# Patient Record
Sex: FEMALE | Race: BLACK OR AFRICAN AMERICAN | Employment: UNEMPLOYED | URBAN - METROPOLITAN AREA
[De-identification: names, ages, dates, MRNs, and addresses within clinical notes are randomized per-mention and may not be internally consistent; named-entity substitution may affect disease eponyms.]

---

## 2020-02-24 ENCOUNTER — APPOINTMENT (OUTPATIENT)
Dept: CT IMAGING | Age: 35
End: 2020-02-24
Payer: COMMERCIAL

## 2020-02-24 ENCOUNTER — HOSPITAL ENCOUNTER (EMERGENCY)
Age: 35
Discharge: HOME | End: 2020-02-24
Attending: EMERGENCY MEDICINE
Payer: COMMERCIAL

## 2020-02-24 VITALS
BODY MASS INDEX: 50.02 KG/M2 | OXYGEN SATURATION: 100 % | SYSTOLIC BLOOD PRESSURE: 140 MMHG | TEMPERATURE: 98.2 F | HEIGHT: 64 IN | DIASTOLIC BLOOD PRESSURE: 76 MMHG | RESPIRATION RATE: 16 BRPM | WEIGHT: 293 LBS | HEART RATE: 70 BPM

## 2020-02-24 PROCEDURE — 70486 CT MAXILLOFACIAL W/O DYE: CPT

## 2020-02-24 PROCEDURE — 99283 EMERGENCY DEPT VISIT LOW MDM: CPT

## 2020-02-24 PROCEDURE — 70450 CT HEAD/BRAIN W/O DYE: CPT

## 2020-02-24 PROCEDURE — 6370000000 HC RX 637 (ALT 250 FOR IP): Performed by: EMERGENCY MEDICINE

## 2020-02-24 RX ORDER — OXYCODONE HYDROCHLORIDE AND ACETAMINOPHEN 5; 325 MG/1; MG/1
1 TABLET ORAL EVERY 6 HOURS PRN
Qty: 10 TABLET | Refills: 0 | Status: SHIPPED | OUTPATIENT
Start: 2020-02-24 | End: 2020-02-27

## 2020-02-24 RX ORDER — OXYCODONE HYDROCHLORIDE AND ACETAMINOPHEN 5; 325 MG/1; MG/1
1 TABLET ORAL ONCE
Status: COMPLETED | OUTPATIENT
Start: 2020-02-24 | End: 2020-02-24

## 2020-02-24 RX ADMIN — OXYCODONE HYDROCHLORIDE AND ACETAMINOPHEN 1 TABLET: 5; 325 TABLET ORAL at 14:11

## 2020-02-24 ASSESSMENT — PAIN SCALES - GENERAL
PAINLEVEL_OUTOF10: 7
PAINLEVEL_OUTOF10: 7

## 2020-02-24 ASSESSMENT — ENCOUNTER SYMPTOMS
TROUBLE SWALLOWING: 0
SHORTNESS OF BREATH: 0
WHEEZING: 0
STRIDOR: 0
COLOR CHANGE: 0
NAUSEA: 0
ABDOMINAL PAIN: 0
EYE PAIN: 1
VOMITING: 0
VOICE CHANGE: 0
FACIAL SWELLING: 1

## 2020-02-24 ASSESSMENT — VISUAL ACUITY
OU: 20/70
OS: 20/70

## 2020-02-24 ASSESSMENT — PAIN DESCRIPTION - ORIENTATION: ORIENTATION: RIGHT

## 2020-02-24 ASSESSMENT — PAIN DESCRIPTION - DESCRIPTORS: DESCRIPTORS: POUNDING

## 2020-02-24 ASSESSMENT — PAIN DESCRIPTION - LOCATION: LOCATION: EYE

## 2020-02-24 ASSESSMENT — PAIN DESCRIPTION - FREQUENCY: FREQUENCY: CONTINUOUS

## 2020-02-24 NOTE — ED NOTES
Metal eye shield applied to right eye per order of Dr. Jesu Yuen.      Geoffrey Ovalle RN  02/24/20 5895
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
## 2020-02-24 NOTE — ED NOTES
Access center contacted to repage facial plastics at [facility]. If they are still unavailable Dr. Chase Banks would like ophthalmology at [facility] paged.      Antonio Shaw RN  02/24/20 7339

## 2020-02-24 NOTE — ED NOTES
Received call from [facility] from Access center who states she spoke with [facility] who states ED at [facility] wants us to talk to facial plastic surgeon to have them accept the patient. Dr Loren Jin made aware.      Roxanne Bay, RN  02/24/20 4660

## 2020-02-24 NOTE — ED NOTES
Visual acuity right eye 20/50 with Dr. David Wright holding eye open. Patient aware of our continued efforts to speak with  facial plastic surgeon.      Priscella Andressa, PRACHI  02/24/20 [address], RN  02/24/20 1096

## 2020-02-24 NOTE — ED PROVIDER NOTES
unexpected weight change. HENT: Positive for facial swelling. Negative for trouble swallowing and voice change. Eyes: Positive for pain. Respiratory: Negative for shortness of breath, wheezing and stridor. Cardiovascular: Negative for chest pain and palpitations. Gastrointestinal: Negative for abdominal pain, nausea and vomiting. Genitourinary: Negative for dysuria and vaginal bleeding. Musculoskeletal: Negative for neck pain and neck stiffness. Skin: Negative for color change and wound. Neurological: Negative for seizures and syncope. Psychiatric/Behavioral: Negative for self-injury and suicidal ideas. Except as noted above the remainder of the review of systems was reviewed and negative. PAST MEDICAL HISTORY   History reviewed. No pertinent past medical history. SURGICAL HISTORY     History reviewed. No pertinent surgical history. CURRENT MEDICATIONS       Previous Medications    No medications on file       ALLERGIES     Patient has no known allergies. FAMILY HISTORY     History reviewed. No pertinent family history. SOCIAL HISTORY       Social History     Socioeconomic History    Marital status:      Spouse name: None    Number of children: None    Years of education: None    Highest education level: None   Occupational History    None   Social Needs    Financial resource strain: None    Food insecurity:     Worry: None     Inability: None    Transportation needs:     Medical: None     Non-medical: None   Tobacco Use    Smoking status: Current Every Day Smoker   Substance and Sexual Activity    Alcohol use:  Yes    Drug use: None    Sexual activity: None   Lifestyle    Physical activity:     Days per week: None     Minutes per session: None    Stress: None   Relationships    Social connections:     Talks on phone: None     Gets together: None     Attends Jew service: None     Active member of club or organization: None     Attends meetings of clubs or organizations: None     Relationship status: None    Intimate partner violence:     Fear of current or ex partner: None     Emotionally abused: None     Physically abused: None     Forced sexual activity: None   Other Topics Concern    None   Social History Narrative    None         PHYSICAL EXAM    (up to 7 for level 4, 8 or more for level 5)     ED Triage Vitals [02/24/20 1245]   BP Temp Temp Source Pulse Resp SpO2 Height Weight   (!) 144/87 98.1 °F (36.7 °C) Oral 70 16 99 % 5' 4\" (1.626 m) (!) 377 lb 13.9 oz (171.4 kg)       Physical Exam  Vitals signs and nursing note reviewed. Constitutional:       Appearance: She is well-developed. She is not diaphoretic. HENT:      Head:      Comments: Obvious swelling to right external eye. No raccoon sign, lemon sign, hemotympanum. No palpable skull fractures. Right Ear: External ear normal.      Left Ear: External ear normal.   Eyes:      Comments: Left external and globe normal.  Significant right periorbital swelling. Eye is able to be opened with external force with diffuse conjunctival hemorrhage to the right eye however pupils equal round and reactive to light. Patient's visual acuity is intact when eye is opened with external force. Patient is able to look up normally but does report pain with downward vision gaze. Neck:      Musculoskeletal: Neck supple. Vascular: No JVD. Trachea: No tracheal deviation. Cardiovascular:      Rate and Rhythm: Normal rate. Pulmonary:      Effort: Pulmonary effort is normal. No respiratory distress. Breath sounds: Normal breath sounds. No wheezing. Abdominal:      General: There is no distension. Palpations: Abdomen is soft. Tenderness: There is no abdominal tenderness. There is no guarding or rebound. Musculoskeletal: Normal range of motion. General: No tenderness or deformity. Skin:     General: Skin is warm and dry.    Neurological:      Mental Status: She is alert. Cranial Nerves: No cranial nerve deficit. DIAGNOSTIC RESULTS         RADIOLOGY:     Interpretation per the Radiologist below, if available at the time of this note:    CT HEAD WO CONTRAST   Final Result   No acute intracranial abnormality. Redemonstration of a right orbital floor fracture as described on recent CT   facial bones. CT MAXILLOFACIAL WO CONTRAST   Final Result   1. Acute, comminuted and depressed right orbital floor fracture with   intraorbital fat and a portion of the right inferior rectus muscle herniating   through the fracture defect. 2.  Thin curvilinear 1.6 cm right retro bulbar intraconal hematoma contains a   punctate focus of air. Asymmetric stranding throughout the right orbit. Mild right proptosis. Both globes are intact. 3.  Hemorrhage in the right maxillary sinus. ED BEDSIDE ULTRASOUND:   Performed by ED Physician - none    LABS:  Labs Reviewed - No data to display    All otherlabs were within normal range or not returned as of this dictation. EMERGENCY DEPARTMENT COURSE and DIFFERENTIAL DIAGNOSIS/MDM:   Vitals:    Vitals:    02/24/20 1245 02/24/20 1412 02/24/20 1528   BP: (!) 144/87 (!) 142/88 (!) 142/78   Pulse: 70 62 72   Resp: 16 17 17   Temp: 98.1 °F (36.7 °C) 98.2 °F (36.8 °C)    TempSrc: Oral     SpO2: 99% 98% 100%   Weight: (!) 377 lb 13.9 oz (171.4 kg)     Height: 5' 4\" (1.626 m)           MDM  Imaging reveals an acute comminuted and depressed right orbital floor fracture with infraorbital fat and a portion of the right inferior muscle herniating through the fractional deficit as well as a curvilinear 1.6 cm right retrobulbar intraconal hematoma. And Hemorrhage in the right maxillary sinus. Head CT does not show any intracranial bleed. I have placed a consult to [facility] and spoke to Dr. Anum Smith with maxillofacial surgery. He has reviewed the patient's imaging.   He does not recommend emergent transfer over to the [facility] for further traumatic work-up but reports [facility] maxillofacial surgery will follow the patient up at 7:30 AM tomorrow for evaluation and possible surgical planning. The patient's vision is rechecked and is grossly intact in the right eye when her eye is able to be open. She expresses understanding and agreement with this plan. She is discharged home with standard ER return precautions. The importance of next-day follow-up is highly stressed to the patient. Procedures    FINAL IMPRESSION      1. Closed fracture of right orbital floor, initial encounter ([facility] [address]    2. Retrobulbar hemorrhage          DISPOSITION/PLAN   DISPOSITION Decision To Discharge 02/24/2020 04:59:59 PM      PATIENT REFERRED TO:  Melida Rodriguez MD  [facility] [address]  [address]  [phone]    In 1 day  Go to second floor of [facility] at [facility] [address] You have a 7:30am appointment with the oral and maxillofacial surgery clnic      DISCHARGE MEDICATIONS:  New Prescriptions    OXYCODONE-ACETAMINOPHEN (PERCOCET) 5-325 MG PER TABLET    Take 1 tablet by mouth every 6 hours as needed for Pain for up to 3 days. WARNING:  May cause drowsiness. May impair ability to operate vehicles or machinery. Do not use in combination with alcohol. (Please note that portions of this note were completed with a voice recognition program.  Efforts were made to edit the dictations but occasionally words aremis-transcribed. )    Boyd Chavez MD (electronically signed)  Attending Emergency Physician       Boyd Chavez MD  02/24/20 9713

## 2020-02-24 NOTE — ED NOTES
AVS reviewed with patient. Verbalized understanding. AVS was printed and given to patient. Printed prescription given to patient.      Grant Fritz RN  02/24/20 8084

## 2020-11-27 ENCOUNTER — HOSPITAL ENCOUNTER (EMERGENCY)
Age: 35
Discharge: HOME | End: 2020-11-27
Attending: EMERGENCY MEDICINE
Payer: COMMERCIAL

## 2020-11-27 ENCOUNTER — APPOINTMENT (OUTPATIENT)
Dept: GENERAL RADIOLOGY | Age: 35
End: 2020-11-27
Payer: COMMERCIAL

## 2020-11-27 VITALS
SYSTOLIC BLOOD PRESSURE: 155 MMHG | DIASTOLIC BLOOD PRESSURE: 75 MMHG | BODY MASS INDEX: 50.02 KG/M2 | HEIGHT: 64 IN | HEART RATE: 87 BPM | TEMPERATURE: 98.3 F | RESPIRATION RATE: 20 BRPM | OXYGEN SATURATION: 100 % | WEIGHT: 293 LBS

## 2020-11-27 PROCEDURE — 73562 X-RAY EXAM OF KNEE 3: CPT

## 2020-11-27 PROCEDURE — 99284 EMERGENCY DEPT VISIT MOD MDM: CPT

## 2020-11-27 PROCEDURE — 6370000000 HC RX 637 (ALT 250 FOR IP): Performed by: EMERGENCY MEDICINE

## 2020-11-27 RX ORDER — IBUPROFEN 600 MG/1
600 TABLET ORAL ONCE
Status: COMPLETED | OUTPATIENT
Start: 2020-11-27 | End: 2020-11-27

## 2020-11-27 RX ORDER — IBUPROFEN 600 MG/1
600 TABLET ORAL 4 TIMES DAILY PRN
Qty: 40 TABLET | Refills: 0 | Status: SHIPPED | OUTPATIENT
Start: 2020-11-27 | End: 2022-05-21 | Stop reason: ALTCHOICE

## 2020-11-27 RX ADMIN — IBUPROFEN 600 MG: 600 TABLET, FILM COATED ORAL at 13:06

## 2020-11-27 ASSESSMENT — PAIN DESCRIPTION - DESCRIPTORS: DESCRIPTORS: ACHING

## 2020-11-27 ASSESSMENT — PAIN DESCRIPTION - ORIENTATION: ORIENTATION: RIGHT;LEFT

## 2020-11-27 ASSESSMENT — PAIN DESCRIPTION - PAIN TYPE: TYPE: ACUTE PAIN

## 2020-11-27 ASSESSMENT — PAIN SCALES - GENERAL: PAINLEVEL_OUTOF10: 7

## 2020-11-27 ASSESSMENT — PAIN DESCRIPTION - LOCATION: LOCATION: KNEE

## 2020-11-27 NOTE — ED NOTES
Patient given prescription, work note, discharge instructions verbal and written, patient verbalized understanding. Alert/oriented X4, Clear speech. Patient exhibits no distress, ambulates with steady gait per self leaving unit, no further request. Patient offered assistance  to laura, patient declined, carried crutches on discharge.      Viv Mercado RN  11/27/20 0034 No
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
## 2022-05-21 ENCOUNTER — HOSPITAL ENCOUNTER (EMERGENCY)
Age: 37
Discharge: HOME | End: 2022-05-21
Attending: EMERGENCY MEDICINE
Payer: MEDICAID

## 2022-05-21 VITALS
SYSTOLIC BLOOD PRESSURE: 103 MMHG | BODY MASS INDEX: 50.02 KG/M2 | HEART RATE: 86 BPM | DIASTOLIC BLOOD PRESSURE: 64 MMHG | RESPIRATION RATE: 18 BRPM | WEIGHT: 293 LBS | HEIGHT: 64 IN | TEMPERATURE: 98.1 F | OXYGEN SATURATION: 97 %

## 2022-05-21 DIAGNOSIS — L03.011 CELLULITIS OF RIGHT THUMB: Primary | ICD-10-CM

## 2022-05-21 PROCEDURE — 6370000000 HC RX 637 (ALT 250 FOR IP): Performed by: EMERGENCY MEDICINE

## 2022-05-21 PROCEDURE — 99283 EMERGENCY DEPT VISIT LOW MDM: CPT

## 2022-05-21 RX ORDER — IBUPROFEN 600 MG/1
600 TABLET ORAL EVERY 6 HOURS PRN
Qty: 30 TABLET | Refills: 0 | Status: SHIPPED | OUTPATIENT
Start: 2022-05-21

## 2022-05-21 RX ORDER — IBUPROFEN 600 MG/1
600 TABLET ORAL ONCE
Status: COMPLETED | OUTPATIENT
Start: 2022-05-21 | End: 2022-05-21

## 2022-05-21 RX ORDER — CEPHALEXIN 500 MG/1
500 CAPSULE ORAL ONCE
Status: COMPLETED | OUTPATIENT
Start: 2022-05-21 | End: 2022-05-21

## 2022-05-21 RX ORDER — CEPHALEXIN 500 MG/1
500 CAPSULE ORAL 4 TIMES DAILY
Qty: 28 CAPSULE | Refills: 0 | Status: SHIPPED | OUTPATIENT
Start: 2022-05-21 | End: 2022-05-28

## 2022-05-21 RX ADMIN — CEPHALEXIN 500 MG: 500 CAPSULE ORAL at 12:58

## 2022-05-21 RX ADMIN — IBUPROFEN 600 MG: 600 TABLET ORAL at 12:58

## 2022-05-21 ASSESSMENT — PAIN DESCRIPTION - DESCRIPTORS: DESCRIPTORS: THROBBING

## 2022-05-21 ASSESSMENT — PAIN SCALES - GENERAL
PAINLEVEL_OUTOF10: 6
PAINLEVEL_OUTOF10: 6

## 2022-05-21 ASSESSMENT — PAIN DESCRIPTION - LOCATION: LOCATION: FINGER (COMMENT WHICH ONE)

## 2022-05-21 ASSESSMENT — PAIN DESCRIPTION - PAIN TYPE: TYPE: ACUTE PAIN

## 2022-05-21 ASSESSMENT — PAIN DESCRIPTION - ORIENTATION: ORIENTATION: RIGHT

## 2022-05-21 NOTE — ED PROVIDER NOTES
CHIEF COMPLAINT  Finger Pain (R 1st finger pain/swelling x2days, no injury per pt.)      HISTORY OF PRESENT ILLNESS  Jett Grant is a 43 y.o. female who presents to the ED complaining of pain and concern for infection of the right thumb. Patient states she had nail work done 1 week ago and has been having pain on the right thumb ever since. States she had a fake nail placed on the right thumb and this was the only finger she had a fake nail placed on. States she took off the fake nail and noticed some redness and swelling near the nail of the right thumb. Denies any drainage coming from the area of swelling. States she has been taking Tylenol PM at night for pain control which does provide some relief. Denies any trauma to the finger or hand. No fevers or chills. No previous injuries to the right hand. No other complaints, modifying factors or associated symptoms. Nursing notes reviewed. History reviewed. No pertinent past medical history. Past Surgical History:   Procedure Laterality Date    EYE SURGERY       History reviewed. No pertinent family history.   Social History     Socioeconomic History    Marital status:      Spouse name: Not on file    Number of children: Not on file    Years of education: Not on file    Highest education level: Not on file   Occupational History    Not on file   Tobacco Use    Smoking status: Current Every Day Smoker     Packs/day: 0.40     Types: Cigarettes    Smokeless tobacco: Never Used   Substance and Sexual Activity    Alcohol use: Yes     Comment: twice a week    Drug use: Yes     Types: Marijuana Curlie Opal)     Comment: 5/21/22 marijuana    Sexual activity: Not on file   Other Topics Concern    Not on file   Social History Narrative    Not on file     Social Determinants of Health     Financial Resource Strain:     Difficulty of Paying Living Expenses: Not on file   Food Insecurity:     Worried About [address] [facility] [facility] in the Last Year: Not SpO2 97%   Breastfeeding No   BMI 66.41 kg/m²      CONSTITUTIONAL: AOx4, cooperative with exam, afebrile   HEAD: normocephalic, atraumatic   EYES: PERRL, EOMI, anicteric sclera   ENT: Moist mucous membranes   LUNGS: Bilateral breath sounds, CTAB, no rales/ronchi/wheezes   CARDIOVASCULAR: RRR, normal S1/S2, no m/r/g, 2+ pulses throughout   ABDOMEN: Soft, non-tender, non-distended, +BS   NEUROLOGIC:  MAEx4, GCS 15   MUSCULOSKELETAL: No clubbing, cyanosis or edema, tenderness and mild swelling noted of the eponychial fold of the right thumb, some purulent fluid noted under the most distal portion of the nail of the right thumb, distal cap refill is in 2 seconds all digits right hand, no fusiform swelling of the right thumb   SKIN: No rash, pallor or wounds on exposed surfaces         RADIOLOGY  X-RAYS:  I have reviewed radiologic plain film image(s). ALL OTHER NON-PLAIN FILM IMAGES SUCH AS CT, ULTRASOUND AND MRI HAVE BEEN READ BY THE RADIOLOGIST. No orders to display          EKG INTERPRETATION  None    PROCEDURES    ED COURSE/MDM  Cellulitis, paronychia, abscess  Patient seen and evaluated. History and physical as above. Nontoxic, afebrile. Patient presents with pain to the right thumb. Does have evidence of cellulitis of the right thumb as well as possible small fluid collection under the nail at the distal portion of the nail. Normal distal perfusion. No fusiform swelling to suggest tenosynovitis. Did discuss trephination to relieve some pressure but patient deferred. Also discussed performing ring block prior to trephination but patient still deferred. Patient would like to try just a course of antibiotics first.  Stressed the importance of follow-up with her primary care physician return for worsening symptoms despite antibiotics. Patient agreeable. Started patient on Keflex with first dose given the emergency room. Discussed using ibuprofen and Tylenol for pain control.   Return instruction provided. Questions answered prior to discharge. Is this patient to be included in the SEP-1 Core Measure due to severe sepsis or septic shock? No   Exclusion criteria - the patient is NOT to be included for SEP-1 Core Measure due to:  2+ SIRS criteria are not met      I estimate there is LOW risk for COMPARTMENT SYNDROME, DEEP VENOUS THROMBOSIS, SEPTIC ARTHRITIS, TENDON OR NEUROVASCULAR INJURY, thus I consider the discharge disposition reasonable. Ashlee Martinez and I have discussed the diagnosis and risks, and we agree with discharging home to follow-up with their primary doctor or the referral orthopedist. We also discussed returning to the Emergency Department immediately if new or worsening symptoms occur. We have discussed the symptoms which are most concerning (e.g., changing or worsening pain, numbness, weakness) that necessitate immediate return. Patient was given scripts for the following medications. I counseled patient how to take these medications. New Prescriptions    CEPHALEXIN (KEFLEX) 500 MG CAPSULE    Take 1 capsule by mouth 4 times daily for 7 days    IBUPROFEN (ADVIL;MOTRIN) 600 MG TABLET    Take 1 tablet by mouth every 6 hours as needed for Pain           CLINICAL IMPRESSION  1. Cellulitis of right thumb        Blood pressure 103/64, pulse 86, temperature 98.1 °F (36.7 °C), temperature source Oral, resp. rate 18, height 5' 4\" (1.626 m), weight (!) 386 lb 14.5 oz (175.5 kg), last menstrual period 05/16/2022, SpO2 97 %, not currently breastfeeding. DISPOSITION  Patient was discharged to home in good condition. [place]  [phone]    Call   For a follow up appointment. Disclaimer: All medical record entries made by [employer] dictation.       (Please note that this note was completed with a voice recognition program. Every attempt was made to edit the dictations, but inevitably there remain words that are mis-transcribed.)            Johnie Barbour MD  05/21/22 1300

## 2022-05-22 NOTE — ED NOTES
Discharge instructions with pt. Explained rx's and diagnoses. Pain at 6 now. Encouraged follow up with PCP.      Dinh Berger RN  05/22/22 6413

## 2022-05-22 NOTE — ED NOTES
Sitting in chair. Pt reports had artificial fingernail put on right thumb jan 2022. Had \"fill in\" done on 5/14 and has been having problems since. Pt removed fingernail herself-collection of white pus noted under right thumbnail. Not draining at all. Pain at 6. Took tylenol at 0600.           Kee Ku, RN  05/22/22 [address] [facility], RN  05/22/22 7974

## 2022-05-27 ENCOUNTER — HOSPITAL ENCOUNTER (EMERGENCY)
Age: 37
Discharge: HOME | End: 2022-05-27
Attending: EMERGENCY MEDICINE
Payer: MEDICAID

## 2022-05-27 VITALS
TEMPERATURE: 98.5 F | OXYGEN SATURATION: 100 % | RESPIRATION RATE: 18 BRPM | HEIGHT: 67 IN | DIASTOLIC BLOOD PRESSURE: 83 MMHG | SYSTOLIC BLOOD PRESSURE: 154 MMHG | BODY MASS INDEX: 45.99 KG/M2 | WEIGHT: 293 LBS | HEART RATE: 72 BPM

## 2022-05-27 DIAGNOSIS — L02.91 ABSCESS: Primary | ICD-10-CM

## 2022-05-27 PROCEDURE — 87205 SMEAR GRAM STAIN: CPT

## 2022-05-27 PROCEDURE — 87070 CULTURE OTHR SPECIMN AEROBIC: CPT

## 2022-05-27 PROCEDURE — 6370000000 HC RX 637 (ALT 250 FOR IP)

## 2022-05-27 PROCEDURE — 87186 SC STD MICRODIL/AGAR DIL: CPT

## 2022-05-27 PROCEDURE — 99283 EMERGENCY DEPT VISIT LOW MDM: CPT

## 2022-05-27 PROCEDURE — 87077 CULTURE AEROBIC IDENTIFY: CPT

## 2022-05-27 RX ORDER — AMOXICILLIN AND CLAVULANATE POTASSIUM 875; 125 MG/1; MG/1
1 TABLET, FILM COATED ORAL 2 TIMES DAILY
Qty: 14 TABLET | Refills: 0 | Status: SHIPPED | OUTPATIENT
Start: 2022-05-27 | End: 2022-06-03

## 2022-05-27 RX ORDER — BUPIVACAINE HYDROCHLORIDE 5 MG/ML
30 INJECTION, SOLUTION EPIDURAL; INTRACAUDAL ONCE
Status: DISCONTINUED | OUTPATIENT
Start: 2022-05-27 | End: 2022-05-27 | Stop reason: HOSPADM

## 2022-05-27 RX ORDER — ACETAMINOPHEN 500 MG
1000 TABLET ORAL ONCE
Status: COMPLETED | OUTPATIENT
Start: 2022-05-27 | End: 2022-05-27

## 2022-05-27 RX ORDER — IBUPROFEN 600 MG/1
600 TABLET ORAL ONCE
Status: COMPLETED | OUTPATIENT
Start: 2022-05-27 | End: 2022-05-27

## 2022-05-27 RX ORDER — NAPROXEN 500 MG/1
500 TABLET ORAL 2 TIMES DAILY
Qty: 20 TABLET | Refills: 0 | Status: SHIPPED | OUTPATIENT
Start: 2022-05-27 | End: 2022-06-06

## 2022-05-27 RX ADMIN — IBUPROFEN 600 MG: 600 TABLET ORAL at 18:38

## 2022-05-27 RX ADMIN — ACETAMINOPHEN 1000 MG: 500 TABLET ORAL at 18:36

## 2022-05-27 ASSESSMENT — PAIN SCALES - GENERAL
PAINLEVEL_OUTOF10: 0
PAINLEVEL_OUTOF10: 0

## 2022-05-27 ASSESSMENT — ENCOUNTER SYMPTOMS
VOMITING: 0
SHORTNESS OF BREATH: 0
RHINORRHEA: 0
COUGH: 0
CONSTIPATION: 0
DIARRHEA: 0
ABDOMINAL PAIN: 0
SORE THROAT: 0
NAUSEA: 0
BACK PAIN: 0
EYE PAIN: 0

## 2022-05-27 NOTE — ED PROVIDER NOTES
[id number]        Pt Name: Patrick Mercado  MRN: [id number]  [place] 1985  Date of evaluation: 5/27/2022  Provider: MARILOU Alcocer CNP  PCP: Jonathan  Note Started: 5:29 PM EDT       I have seen and evaluated this patient with my supervising physician Tiffany Conde MD.      [address]in[address]       Chief Complaint   Patient presents with    Other     rt thumb nail infection         HISTORY OF PRESENT ILLNESS   (Location/Symptom, Timing/Onset, Context/Setting, Quality, Duration, Modifying Factors, Severity)  Note limiting factors. Patrick Mercado is a 43 y.o. female who presents today with abscess under right thumb. This been ongoing for the past 2 weeks. She was recently seen in the ED roughly 6 days ago was started on Keflex. She reports she has been soaking her finger extensively but now has a green-colored abscess underneath the nail. Some swelling as well as dry skin to the distal thumb. She denies any fevers or chills. She has not a diabetic    Nursing Notes were all reviewed and agreed with or any disagreements were addressed in the HPI. REVIEW OF SYSTEMS    (2-9 systems for level 4, 10 or more for level 5)     Review of Systems   Constitutional: Negative for chills, diaphoresis and fever. HENT: Negative for congestion, rhinorrhea and sore throat. Eyes: Negative for pain and visual disturbance. Respiratory: Negative for cough and shortness of breath. Cardiovascular: Negative for chest pain and leg swelling. Gastrointestinal: Negative for abdominal pain, constipation, diarrhea, nausea and vomiting. Genitourinary: Negative for frequency and hematuria. Musculoskeletal: Negative for back pain and neck pain. Skin: Negative for rash and wound. Abscess right thumb as above   Neurological: Negative for dizziness and light-headedness. PAST MEDICAL HISTORY   History reviewed.  No pertinent past medical history. SURGICAL HISTORY     Past Surgical History:   Procedure Laterality Date    EYE SURGERY         CURRENTMEDICATIONS       Discharge Medication List as of 5/27/2022  6:42 PM      CONTINUE these medications which have NOT CHANGED    Details   cephALEXin (KEFLEX) 500 MG capsule Take 1 capsule by mouth 4 times daily for 7 days, Disp-28 capsule, R-0Normal      ibuprofen (ADVIL;MOTRIN) 600 MG tablet Take 1 tablet by mouth every 6 hours as needed for Pain, Disp-30 tablet, R-0Normal             ALLERGIES     Patient has no known allergies. FAMILYHISTORY     History reviewed. No pertinent family history. SOCIAL HISTORY       Social History     Socioeconomic History    Marital status:      Spouse name: None    Number of children: None    Years of education: None    Highest education level: None   Occupational History    None   Tobacco Use    Smoking status: Current Every Day Smoker     Packs/day: 0.40     Types: Cigarettes    Smokeless tobacco: Current User   Substance and Sexual Activity    Alcohol use: Yes     Comment: twice a week    Drug use: Yes     Types: Marijuana Zollie Axe)     Comment: 5/21/22 marijuana    Sexual activity: None   Other Topics Concern    None   Social History Narrative    None     Social Determinants of Health     Financial Resource Strain:     Difficulty of Paying Living Expenses: Not on file   Food Insecurity:     Worried About Running Out of Food in the Last Year: Not on file    [place] of Food in the Last Year: Not on file   Transportation Needs:     Lack of Transportation (Medical): Not on file    Lack of Transportation (Non-Medical):  Not on file   Physical Activity:     Days of Exercise per Week: Not on file    Minutes of Exercise per Session: Not on file   Stress:     Feeling of Stress : Not on file   Social Connections:     Frequency of Communication with Friends and Family: Not on file    Frequency of Social Gatherings with Friends and Family: Not on file    Attends Muslim Services: Not on file    Active Member of Clubs or Organizations: Not on file    Attends Club or Organization Meetings: Not on file    Marital Status: Not on file   Intimate Partner Violence:     Fear of Current or Ex-Partner: Not on file    Emotionally Abused: Not on file    Physically Abused: Not on file    Sexually Abused: Not on file   Housing Stability:     Unable to Pay for Housing in the Last Year: Not on file    Number of [place] in the Last Year: Not on file    Unstable Housing in the Last Year: Not on file       SCREENINGS             PHYSICAL EXAM    (up to 7 for level 4, 8 or more for level 5)     ED Triage Vitals [05/27/22 1723]   BP Temp Temp Source Heart Rate Resp SpO2 Height Weight   (!) 154/83 98.5 °F (36.9 °C) Oral 72 18 100 % 5' 7\" (1.702 m) (!) 393 lb 11.9 oz (178.6 kg)       Physical Exam  Vitals and nursing note reviewed. Constitutional:       General: She is in acute distress (Secondary to pain). Appearance: Normal appearance. She is obese. She is not ill-appearing or diaphoretic. HENT:      Head: Normocephalic and atraumatic. Right Ear: External ear normal.      Left Ear: External ear normal.      Nose: Nose normal. No congestion or rhinorrhea. Mouth/Throat:      Mouth: Mucous membranes are moist.      Pharynx: Oropharynx is clear. No oropharyngeal exudate. Eyes:      General: No scleral icterus. Right eye: No discharge. Left eye: No discharge. Extraocular Movements: Extraocular movements intact. Conjunctiva/sclera: Conjunctivae normal.      Pupils: Pupils are equal, round, and reactive to light. Cardiovascular:      Rate and Rhythm: Normal rate and regular rhythm. Pulses: Normal pulses. Heart sounds: Normal heart sounds. No murmur heard. No friction rub. No gallop. Pulmonary:      Effort: Pulmonary effort is normal. No respiratory distress.       Breath sounds: Normal breath sounds. No stridor. No wheezing, rhonchi or rales. Abdominal:      General: Bowel sounds are normal. There is no distension. Palpations: Abdomen is soft. Tenderness: There is no abdominal tenderness. There is no guarding. Musculoskeletal:         General: No swelling, tenderness or deformity. Normal range of motion. Cervical back: Normal range of motion and neck supple. No rigidity. Skin:     General: Skin is warm and dry. Capillary Refill: Capillary refill takes less than 2 seconds. Coloration: Skin is not pale. Findings: No rash. Comments: Right thumb abscess appears to be mostly subungual.  See image below. patient is neurovascularly intact   Neurological:      General: No focal deficit present. Mental Status: She is alert and oriented to person, place, and time. Psychiatric:         Mood and Affect: Mood normal.         Behavior: Behavior normal.             DIAGNOSTIC RESULTS   LABS:    Labs Reviewed   CULTURE, WOUND       When ordered, only abnormal lab results are displayed. All other labs were within normal range or not returned as of this dictation. EKG: When ordered, EKG's are interpreted by the Emergency Department Physician in the absence of a cardiologist.  Please see their note for interpretation of EKG. RADIOLOGY:   Non-plain film images such as CT, Ultrasound and MRI are read by the radiologist. Plain radiographic images are visualized andpreliminarily interpreted by the  ED Provider with the below findings:        Interpretation perthe Radiologist below, if available at the time of this note:    No orders to display     No results found.       PROCEDURES   Unless otherwise noted below, none     Procedures    CRITICAL CARE TIME   N/A    CONSULTS:  None      EMERGENCY DEPARTMENT COURSE and DIFFERENTIAL DIAGNOSIS/MDM:   Vitals:    Vitals:    05/27/22 1723   BP: (!) 154/83   Pulse: 72   Resp: 18   Temp: 98.5 °F (36.9 °C)   TempSrc: Oral   SpO2: 100%   Weight: (!) 393 lb 11.9 oz (178.6 kg)   Height: 5' 7\" (1.702 m)       Patient was given thefollowing medications:  Medications   bupivacaine (MARCAINE) 0.5 % injection 150 mg (has no administration in time range)   acetaminophen (TYLENOL) tablet 1,000 mg (1,000 mg Oral Given 5/27/22 1836)   ibuprofen (ADVIL;MOTRIN) tablet 600 mg (600 mg Oral Given 5/27/22 1838)         Is this patient to be included in the SEP-1 Core Measure due to severe sepsis or septic shock? No   Exclusion criteria - the patient is NOT to be included for SEP-1 Core Measure due to: Infection is not suspected      Differential diagnosis: Paronychia, deep space infection, cyst, other    Presentation, ROS and PE as above. Nontoxic appearance, they are afebrile and without co-morbid conditions to complicate this. Vitals reviewed and are stable. There is no indication for labs or imaging studies at this time. See procedure note for I&D by Dr Tamra Rutherford. Tetanus status was addressed-Tdap greater than 5 years ago however patient declines update today. Patient verbalized comprehension of close follow-up and need for recheck in two days. Instructions provided to apply warm compresses at home. Patient was counseled to return immediately if worse or any change in signs or symptoms, specifically, worsening of pain, redness, swelling, numbness, loss of function or a fever. Pain management and follow-up plan was discussed with the patient. Patient is without significant evidence of cellulitis, compartment syndrome, necrotizing fasciitis, tendon/neurovascular injury, foreign body, toxicity, shock, sepsis, unstable arrhythmia, hemodynamic or cardiopulmonary instability, herpes zoster, anaphylaxis, sepsis, meningitis or meningococcemia, vasculitis, TSS, SJS,  or other process requiring immediate medical or surgical intervention.   It is understood that if they are not improving as expected or if other new signs or symptoms of concern develop, other etiologies or diagnoses may need to be considered requiring other tests, treatments, consultations, and/or admission. The diagnosis, plan, expected course, follow-up, and return precautions were discussed and all questions were answered. FINAL IMPRESSION      1.  Abscess          DISPOSITION/PLAN   DISPOSITION Decision To Discharge 05/27/2022 06:28:02 PM      PATIENT REFERREDTO:  Jonathan  [phone]    Call   Follow up on your recent ED visit, For wound re-check      DISCHARGE MEDICATIONS:  Discharge Medication List as of 5/27/2022  6:42 PM      START taking these medications    Details   amoxicillin-clavulanate (AUGMENTIN) 875-125 MG per tablet Take 1 tablet by mouth 2 times daily for 7 days, Disp-14 tablet, R-0Normal      naproxen (NAPROSYN) 500 MG tablet Take 1 tablet by mouth 2 times daily for 10 days, Disp-20 tablet, R-0Normal             DISCONTINUED MEDICATIONS:  Discharge Medication List as of 5/27/2022  6:42 PM                 (Please note that portions ofthis note were completed with a voice recognition program.  Efforts were made to edit the dictations but occasionally words are mis-transcribed.)    MARILOU Villa CNP (electronically signed)             MARILOU Villa CNP  05/27/22 2046

## 2022-05-27 NOTE — ED NOTES
Pt states had noticed greenish discoloration under nail bed past few days increase pain     Deyanira Jimenez, PRACHI  05/27/22 0326

## 2022-05-27 NOTE — ED PROVIDER NOTES
[id number] [facility]  eMERGENCY dEPARTMENT eNCOUnter   Physician Attestation    Pt Name: Safia Sepulveda  MRN: [id number]  [place] 1985  Date of evaluation: 5/27/22        Physician Note:    This patient was seen by the advanced practice provider. I have personally performed a face to face diagnostic evaluation on this patient and performed a substantive portion of the visit including all aspects of the medical decision making. History: Briefly, 43 y.o. female presents with an infection of the right thumb. She is headed over a week now. She was here and placed on Keflex. The infection actually seems to be subungual.  She was offered trephination but I declined it. She has been on cephalexin. She is concerned because she does not think it is quite getting better. Complaining of throbbing pain in the right thumb. No associated fever chills nausea or vomiting. States she is not a diabetic. Physical Exam  Vitals and nursing note reviewed. Constitutional:       Appearance: She is well-developed. She is not diaphoretic. HENT:      Head: Normocephalic and atraumatic. Right Ear: External ear normal.      Left Ear: External ear normal.   Eyes:      General: No scleral icterus. Right eye: No discharge. Left eye: No discharge. Conjunctiva/sclera: Conjunctivae normal.   Neck:      Trachea: No tracheal deviation. Pulmonary:      Effort: Pulmonary effort is normal. No respiratory distress. Breath sounds: No stridor. Musculoskeletal:         General: Tenderness present. Normal range of motion. Cervical back: Normal range of motion. Comments: Patient has a greenish discoloration in the subungual area distally on the right thumb. She has dried cracked callused skin noted along the distal tip of the right distal phalanx of the thumb. Very tender to touch. However, she does not have tenosynovitis. Skin:     General: Skin is warm and dry. Neurological:      Mental Status: She is alert and oriented to person, place, and time. Coordination: Coordination normal.   Psychiatric:         Behavior: Behavior normal.               MDM:     Incision/Drainage    Date/Time: 5/27/2022 6:00 PM  Performed by: Ketan Monteiro MD  Authorized by: Ketan Monteiro MD     Consent:     Consent obtained:  Written and verbal    Consent given by:  Patient    Risks discussed:  Incomplete drainage  Location:     Type:  Abscess    Size:  1    Location:  Upper extremity    Upper extremity location:  Finger    Finger location:  R thumb  Pre-procedure details:     Skin preparation:  Hibiclens  Anesthesia (see MAR for exact dosages): Anesthesia method:  Nerve block    Block needle gauge:  27 G    Block anesthetic:  Lidocaine 1% w/o epi and bupivacaine 0.5% w/o epi    Block technique:  Standard thumb block. Block injection procedure:  Anatomic landmarks identified, anatomic landmarks palpated, introduced needle, negative aspiration for blood and incremental injection    Block outcome:  Incomplete block  Procedure type:     Complexity:  Simple  Procedure details:     Needle aspiration: no      Drainage:  Purulent    Drainage amount: Moderate    Wound treatment:  Wound left open    Packing materials:  None  Post-procedure details:     Patient tolerance of procedure: Tolerated well, no immediate complications  Comments:      Due to the location of this I first used a electrocautery device to perform a nail trephination. I try to expand this into a circular fashion to open this up better but that this did not work well so it ended up being linear. I then used an 18-gauge needle just to puncture the distal tip. I did get a lot of purulent greenish material out of this. She still had some discomfort. It was not a complete block but was about 80% effective. She tolerated it well. No results found for this visit on 05/27/22.     CRITICAL CARE  I personally saw the patient and independently provided 0 minutes of non-concurrent critical care out of the total shared critical care time provided. This excludes seperately billable procedures. Critical care time was provided for 0 that required close evaluation and/or intervention with concern for potential patient decompensation. 1. Abscess          DISPOSITION/PLAN  PATIENT REFERRED TO:  [id number]  [phone]    Call   Follow up on your recent ED visit, For wound re-check    DISCHARGE MEDICATIONS:  New Prescriptions    AMOXICILLIN-CLAVULANATE (AUGMENTIN) 875-125 MG PER TABLET    Take 1 tablet by mouth 2 times daily for 7 days    NAPROXEN (NAPROSYN) 500 MG TABLET    Take 1 tablet by mouth 2 times daily for 10 days         Anjali Gonzalez MD        This report has been produced using speech recognition software and may contain errors related to that system including errors in grammar, punctuation, and spelling, as well as words and phrases that may be inappropriate. If there are any questions or concerns please feel free to contact the dictating provider for clarification.         Anjali Gonzalez MD  05/27/22 6338

## 2022-05-27 NOTE — Clinical Note
Joseline Myers was seen and treated in our emergency department on 5/27/2022. She may return to work on 05/30/2022. If you have any questions or concerns, please don't hesitate to call.       Delmy Joiner MD

## 2022-06-14 LAB
GRAM STAIN RESULT: ABNORMAL
ORGANISM: ABNORMAL
ORGANISM: ABNORMAL
WOUND/ABSCESS: ABNORMAL

## 2022-12-11 ENCOUNTER — HOSPITAL ENCOUNTER (EMERGENCY)
Age: 37
Discharge: HOME | End: 2022-12-11
Attending: EMERGENCY MEDICINE
Payer: MEDICAID

## 2022-12-11 ENCOUNTER — APPOINTMENT (OUTPATIENT)
Dept: CT IMAGING | Age: 37
End: 2022-12-11
Payer: MEDICAID

## 2022-12-11 VITALS
HEIGHT: 63 IN | BODY MASS INDEX: 51.91 KG/M2 | SYSTOLIC BLOOD PRESSURE: 124 MMHG | DIASTOLIC BLOOD PRESSURE: 70 MMHG | WEIGHT: 293 LBS | RESPIRATION RATE: 20 BRPM | HEART RATE: 72 BPM | TEMPERATURE: 98.3 F | OXYGEN SATURATION: 98 %

## 2022-12-11 DIAGNOSIS — R51.9 ACUTE NONINTRACTABLE HEADACHE, UNSPECIFIED HEADACHE TYPE: Primary | ICD-10-CM

## 2022-12-11 DIAGNOSIS — L03.213 PERIORBITAL CELLULITIS OF LEFT EYE: ICD-10-CM

## 2022-12-11 PROCEDURE — 96372 THER/PROPH/DIAG INJ SC/IM: CPT

## 2022-12-11 PROCEDURE — 99284 EMERGENCY DEPT VISIT MOD MDM: CPT

## 2022-12-11 PROCEDURE — 70450 CT HEAD/BRAIN W/O DYE: CPT

## 2022-12-11 PROCEDURE — 6370000000 HC RX 637 (ALT 250 FOR IP): Performed by: EMERGENCY MEDICINE

## 2022-12-11 PROCEDURE — 6360000002 HC RX W HCPCS: Performed by: EMERGENCY MEDICINE

## 2022-12-11 RX ORDER — ACETAMINOPHEN 500 MG
1000 TABLET ORAL ONCE
Status: COMPLETED | OUTPATIENT
Start: 2022-12-11 | End: 2022-12-11

## 2022-12-11 RX ORDER — RIZATRIPTAN BENZOATE 5 MG/1
5 TABLET ORAL DAILY PRN
Qty: 5 TABLET | Refills: 0 | Status: SHIPPED | OUTPATIENT
Start: 2022-12-11

## 2022-12-11 RX ORDER — ACETAMINOPHEN 500 MG
1000 TABLET ORAL EVERY 6 HOURS PRN
COMMUNITY

## 2022-12-11 RX ORDER — PROCHLORPERAZINE EDISYLATE 5 MG/ML
10 INJECTION INTRAMUSCULAR; INTRAVENOUS ONCE
Status: COMPLETED | OUTPATIENT
Start: 2022-12-11 | End: 2022-12-11

## 2022-12-11 RX ORDER — AMOXICILLIN AND CLAVULANATE POTASSIUM 875; 125 MG/1; MG/1
1 TABLET, FILM COATED ORAL ONCE
Status: COMPLETED | OUTPATIENT
Start: 2022-12-11 | End: 2022-12-11

## 2022-12-11 RX ORDER — AMOXICILLIN AND CLAVULANATE POTASSIUM 875; 125 MG/1; MG/1
1 TABLET, FILM COATED ORAL 2 TIMES DAILY
Qty: 14 TABLET | Refills: 0 | Status: SHIPPED | OUTPATIENT
Start: 2022-12-11 | End: 2022-12-18

## 2022-12-11 RX ADMIN — PROCHLORPERAZINE EDISYLATE 10 MG: 5 INJECTION INTRAMUSCULAR; INTRAVENOUS at 11:51

## 2022-12-11 RX ADMIN — ACETAMINOPHEN 1000 MG: 500 TABLET ORAL at 11:47

## 2022-12-11 RX ADMIN — AMOXICILLIN AND CLAVULANATE POTASSIUM 1 TABLET: 875; 125 TABLET, FILM COATED ORAL at 11:50

## 2022-12-11 ASSESSMENT — PAIN DESCRIPTION - ONSET: ONSET: SUDDEN

## 2022-12-11 ASSESSMENT — PAIN DESCRIPTION - DESCRIPTORS: DESCRIPTORS: ACHING;THROBBING

## 2022-12-11 ASSESSMENT — PAIN DESCRIPTION - PAIN TYPE: TYPE: ACUTE PAIN

## 2022-12-11 ASSESSMENT — PAIN DESCRIPTION - LOCATION: LOCATION: HEAD

## 2022-12-11 ASSESSMENT — PAIN - FUNCTIONAL ASSESSMENT
PAIN_FUNCTIONAL_ASSESSMENT: 0-10
PAIN_FUNCTIONAL_ASSESSMENT: NONE - DENIES PAIN

## 2022-12-11 ASSESSMENT — PAIN DESCRIPTION - FREQUENCY: FREQUENCY: INTERMITTENT

## 2022-12-11 ASSESSMENT — PAIN SCALES - GENERAL
PAINLEVEL_OUTOF10: 6
PAINLEVEL_OUTOF10: 6

## 2022-12-11 ASSESSMENT — PAIN DESCRIPTION - ORIENTATION: ORIENTATION: LEFT

## 2022-12-11 NOTE — DISCHARGE INSTRUCTIONS
Your prescription has been sent to [facility]. You can also take Acetaminophen (Tylenol) and/or Naproxen (Aleve) as needed for headaches. Be sure to contact your primary care provider's office to arrange for a follow up visit. If you have any new or worsening issues after going home don't hesitate to return here for reevaluation at any time 24/7!

## 2022-12-14 NOTE — ED PROVIDER NOTES
[id number] [facility]    Name: Asia Giron : 1985 MRN: [id number] Date of Service: 2022    /70   Pulse 72   Temp 98.3 °F (36.8 °C) (Oral)   Resp 20   Ht 5' 3\" (1.6 m)   Wt (!) 385 lb 12.9 oz (175 kg)   SpO2 98%   BMI 68.34 kg/m²     CC: headache    HPI: this patient is a 42 y.o. female presenting to the ED from home. Headache    Onset: gradual  Location: left temporal and left periorbital  Duration: 4-5 days   Character: pressure-like, throbbing  Aggravating: none  Alleviating: none  Radiation: none  Timing: nearly constant  Severity: moderate-to-severe    History of headaches: not often  Similar location to previous HA: no  Similar character to previous HA: no  Maximal intensity at onset: no  Syncope with HA: no  Recent head or neck trauma: denies  Associated symptoms: nausea  Treatments tried at home: none  Family or personal history of intracranial aneurysm: denies    Ms. Hudson Stands tells me that for the past few days she has been dealing with a headache as described above. It has remained fairly constant over time and nothing seems to make it much better or worse. Along with the headache she has noticed slight swelling to the area just above her left eye. She notes that she has had a lot of family stressors as of late and she wonders if this could be contributing to her current condition. She has not appreciated other changes from her usual state of health and she denies other current complaints.  She has not had any headaches like this one before but she has had previous headaches that were more severe than this one.  _____________________________________________________________________    Past Medical History:   Diagnosis Date    Class 3 severe obesity with body mass index (BMI) of 60.0 to 69.9 in [facility])     Osteoarthritis     Tobacco use disorder        Past Surgical History:   Procedure Laterality Date     SECTION      x 3    ORBITAL FRACTURE SURGERY 2020    TUBAL LIGATION         Social History     Tobacco Use    Smoking status: Every Day     Packs/day: 0.40     Types: Cigarettes    Smokeless tobacco: Current   Vaping Use    Vaping Use: Some days   Substance Use Topics    Alcohol use: Yes     Alcohol/week: 2.0 standard drinks     Types: 2 Standard drinks or equivalent per week    Drug use: Yes     Types: Marijuana [facility])       Family History   Problem Relation Age of Onset    No Known Problems Mother     No Known Problems Father      _____________________________________________________________________    Review of Systems   Constitutional:  Negative for chills, fatigue and fever. HENT:  Negative for hearing loss and tinnitus. Eyes:  Negative for photophobia, pain, redness and visual disturbance. Respiratory:  Negative for cough and shortness of breath. Cardiovascular:  Negative for chest pain. Gastrointestinal:  Positive for nausea. Negative for abdominal pain and vomiting. Genitourinary:  Negative for decreased urine volume. Musculoskeletal:  Negative for gait problem, neck pain and neck stiffness. Skin:  Negative for rash. Allergic/Immunologic: Negative for immunocompromised state. Neurological:  Positive for headaches. Negative for weakness and numbness. Psychiatric/Behavioral:  Negative for confusion. Physical Exam  Constitutional:       General: She is awake. She is not in acute distress. Appearance: She is obese. She is not ill-appearing, toxic-appearing or diaphoretic. HENT:      Head: Normocephalic and atraumatic. Eyes:      General: Vision grossly intact. Gaze aligned appropriately. Extraocular Movements: Extraocular movements intact. Conjunctiva/sclera: Conjunctivae normal.      Right eye: Right conjunctiva is not injected. No chemosis or exudate. Left eye: Left conjunctiva is not injected. No chemosis or exudate. Pupils: Pupils are equal, round, and reactive to light.       Funduscopic exam: Right eye: No papilledema. Left eye: No papilledema. Visual Fields: Right eye visual fields normal and left eye visual fields normal.      Comments: Trace swelling and trace erythema to the left superior periorbital soft tissues. Neck:      Vascular: No JVD. Cardiovascular:      Rate and Rhythm: Normal rate and regular rhythm. Pulses:           Radial pulses are 2+ on the right side and 2+ on the left side. Heart sounds: Normal heart sounds. No murmur heard. Pulmonary:      Effort: Pulmonary effort is normal. No accessory muscle usage. Breath sounds: Normal breath sounds. Abdominal:      General: Bowel sounds are normal. There is no distension. Palpations: Abdomen is soft. Tenderness: There is no abdominal tenderness. There is no guarding or rebound. Skin:     General: Skin is warm and dry. Coloration: Skin is not cyanotic, mottled or pale. Neurological:      Mental Status: She is alert and oriented to person, place, and time. Gait: Gait is intact. Gait normal.      Comments:   Pupils equal, round, reactive to light and accommodation  Extraocular movements intact  Sensation to light touch intact and equal in V1, V2, and V3 distributions bilaterally. Face is symmetric with normal eye closure and smile. Hearing is normal to rubbing fingers  Palate elevates symmetrically. Phonation is normal.  Head turning and shoulder shrug are intact  Tongue is midline with normal movements and no atrophy.   RUE - 5/5 strength, normal bulk and tone, no tremor  RLE - 5/5 strength, normal bulk and tone, no tremor  LUE - 5/5 strength, normal bulk and tone, no tremor  LLE - 5/5 strength, normal bulk and tone, no tremor  Sensation to light touch intact and equal all extremities  Finger-to-nose movements intact without ataxia  Alert and oriented to self, persons, place, month, situation  Recent and remote memory intact   Psychiatric:         Speech: Speech normal. Speech is not delayed or slurred. Behavior: Behavior normal.         Cognition and Memory: Cognition is not impaired. Memory is not impaired.     _____________________________________________________________________    RESULTS:    CT HEAD WO CONTRAST   Final Result   1. No acute intracranial abnormality. _____________________________________________________________________    Is this patient to be included in the SEP-1 Core Measure? No (no signs of sepsis or shock)  _____________________________________________________________________    MEDICAL DECISION MAKING & PLANS:    I reviewed the patient's recent and/or pertinent records, current medications, nurses notes, allergies, and vital signs. Differential Diagnosis:  Periorbital cellulitis  Primary headaches  Less likely orbital cellulitis  Less likely CNS mass    Labs & Studies:  CT head    Treatments:  Prochlorperazine IM  Acetaminophen PO  Amox-Clav PO    Discharge Medication List as of 12/11/2022  1:17 PM        START taking these medications    Details   rizatriptan (MAXALT) 5 MG tablet Take 1 tablet by mouth daily as needed (Headaches) May repeat in 2 hours if needed, Disp-5 tablet, R-0Normal      amoxicillin-clavulanate (AUGMENTIN) 875-125 MG per tablet Take 1 tablet by mouth 2 times daily for 7 days, Disp-14 tablet, R-0Normal           Disposition:  Ms. Dylan Alonso is well-appearing on reevaluation. She reports that her symptoms have improved and are well-controlled now. Her exam findings are largely reassuring. CT imaging of her head is reassuring as well and does not show acute or emergent findings. Suspect that she has a mild or early, left-sided, periorbital cellulitis +/- a primary headache etiology. Discussed results, Dx, and expected clinical course with her at length. She is comfortable with DC home now.  Return precautions reviewed in detail and outpatient follow up advised. _____________________________________________________________________    Clinical Impression(s)  1. Acute nonintractable headache, unspecified headache type    2. Periorbital cellulitis of left eye        Provider Signature: MD Juaquin Gilmore MD  12/16/22 5944

## 2022-12-16 ASSESSMENT — ENCOUNTER SYMPTOMS
ABDOMINAL PAIN: 0
COUGH: 0
EYE PAIN: 0
PHOTOPHOBIA: 0
NAUSEA: 1
EYE REDNESS: 0
SHORTNESS OF BREATH: 0
VOMITING: 0

## 2022-12-16 ASSESSMENT — VISUAL ACUITY: OU: 1

## 2023-07-31 ENCOUNTER — HOSPITAL ENCOUNTER (EMERGENCY)
Age: 38
Discharge: HOME | End: 2023-07-31
Payer: COMMERCIAL

## 2023-07-31 VITALS
RESPIRATION RATE: 17 BRPM | TEMPERATURE: 98.1 F | HEART RATE: 71 BPM | WEIGHT: 293 LBS | OXYGEN SATURATION: 100 % | HEIGHT: 64 IN | SYSTOLIC BLOOD PRESSURE: 156 MMHG | DIASTOLIC BLOOD PRESSURE: 78 MMHG | BODY MASS INDEX: 50.02 KG/M2

## 2023-07-31 DIAGNOSIS — S29.019A THORACIC MYOFASCIAL STRAIN, INITIAL ENCOUNTER: Primary | ICD-10-CM

## 2023-07-31 PROCEDURE — 99284 EMERGENCY DEPT VISIT MOD MDM: CPT

## 2023-07-31 PROCEDURE — 6370000000 HC RX 637 (ALT 250 FOR IP): Performed by: NURSE PRACTITIONER

## 2023-07-31 PROCEDURE — 96372 THER/PROPH/DIAG INJ SC/IM: CPT

## 2023-07-31 PROCEDURE — 6360000002 HC RX W HCPCS: Performed by: NURSE PRACTITIONER

## 2023-07-31 RX ORDER — LIDOCAINE 50 MG/G
1 PATCH TOPICAL DAILY
Qty: 10 PATCH | Refills: 0 | Status: SHIPPED | OUTPATIENT
Start: 2023-07-31 | End: 2023-08-10

## 2023-07-31 RX ORDER — PREDNISONE 10 MG/1
60 TABLET ORAL DAILY
Qty: 30 TABLET | Refills: 0 | Status: SHIPPED | OUTPATIENT
Start: 2023-07-31 | End: 2023-08-05

## 2023-07-31 RX ORDER — ACETAMINOPHEN 500 MG
1000 TABLET ORAL 3 TIMES DAILY PRN
Qty: 180 TABLET | Refills: 0 | Status: SHIPPED | OUTPATIENT
Start: 2023-07-31

## 2023-07-31 RX ORDER — KETOROLAC TROMETHAMINE 15 MG/ML
15 INJECTION, SOLUTION INTRAMUSCULAR; INTRAVENOUS ONCE
Status: COMPLETED | OUTPATIENT
Start: 2023-07-31 | End: 2023-07-31

## 2023-07-31 RX ORDER — HYDROCODONE BITARTRATE AND ACETAMINOPHEN 5; 325 MG/1; MG/1
1 TABLET ORAL ONCE
Status: COMPLETED | OUTPATIENT
Start: 2023-07-31 | End: 2023-07-31

## 2023-07-31 RX ORDER — LIDOCAINE 4 G/G
1 PATCH TOPICAL ONCE
Status: DISCONTINUED | OUTPATIENT
Start: 2023-07-31 | End: 2023-07-31 | Stop reason: HOSPADM

## 2023-07-31 RX ORDER — ORPHENADRINE CITRATE 30 MG/ML
60 INJECTION INTRAMUSCULAR; INTRAVENOUS ONCE
Status: COMPLETED | OUTPATIENT
Start: 2023-07-31 | End: 2023-07-31

## 2023-07-31 RX ORDER — CYCLOBENZAPRINE HCL 10 MG
10 TABLET ORAL 3 TIMES DAILY PRN
Qty: 21 TABLET | Refills: 0 | Status: SHIPPED | OUTPATIENT
Start: 2023-07-31 | End: 2023-08-10

## 2023-07-31 RX ADMIN — KETOROLAC TROMETHAMINE 15 MG: 15 INJECTION, SOLUTION INTRAMUSCULAR; INTRAVENOUS at 14:08

## 2023-07-31 RX ADMIN — ORPHENADRINE CITRATE 60 MG: 60 INJECTION INTRAMUSCULAR; INTRAVENOUS at 14:08

## 2023-07-31 RX ADMIN — HYDROCODONE BITARTRATE AND ACETAMINOPHEN 1 TABLET: 5; 325 TABLET ORAL at 14:08

## 2023-07-31 NOTE — ED PROVIDER NOTES
[address]  [address] [place] [id number]  Dept: [phone]  Loc: [address] [facility] ENCOUNTER        =This patient was not seen or evaluated by the attending physician. I evaluated this patient, the attending physician was available for consultation. CHIEF COMPLAINT    Chief Complaint   Patient presents with    Back Pain     Mid back pain x5 days. 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 Pt states she feels like she hit a pot hole and that's what started the back pain. Pt states she has been taking tylenol for pain. HPI    Ana Yusuf is a [age] [employer] y.o. female who presents with back pain, localized in the left thoracic region of the back. The onset was 5 days ago, progressively worsening. Has tried OTC patches and apap without relief. The duration has been intermittent since the onset. The quality of the pain is sharp. The pain worsens with movement. The context is no specific injury or trauma event. Does drive a bus for [employer], hit a pothole and had some mild discomfort afterwards. But progressively worsening. Came to the ED for further evaluation and treatment    REVIEW OF SYSTEMS    General: No fevers or chills  Cardiac: no chest pain, no syncope  Respiratory: no SOB, no cough  GI: No abdominal pain or vomiting  : No dysuria or hematuria  Musculoskeletal: see HPI, no extremity injury  Neurologic: No bowel or bladder incontinence, No saddle anesthesia, No leg weakness  All other systems reviewed and are negative.     PAST MEDICAL & SURGICAL HISTORY    Past Medical History:   Diagnosis Date    Class 3 severe obesity with body mass index (BMI) of 60.0 to 69.9 in adult [facility])     Osteoarthritis     Tobacco use disorder      Past Surgical History:   Procedure Laterality Date     SECTION      x 3    ORBITAL FRACTURE SURGERY      TUBAL LIGATION

## 2023-07-31 NOTE — DISCHARGE INSTRUCTIONS
Please return immediately for any new or worsening symptoms    You have been prescribed medication, flexeril, that causes drowsiness. While this is not a problem under normal circumstances, when taken with alcohol or while driving or operating heavy machinery, this medicine could cause you to become too sleepy and/or hurt yourself or others. Therefore, it is very important that you DO NOT DRIVE, DRINK ALCOHOL, OR OPERATE HEAVY MACHINERY WHILE TAKING THE MEDICINE(S) LISTED ABOVE.

## 2023-07-31 NOTE — ED NOTES
Pt arrives ambulatory to ED with mid back pain x5 days. 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 Pt states she feels like she hit a pot hole and that's what started the back pain. Pt states she has been taking tylenol for pain.       Doe Gottlieb RN  07/31/23 0197

## 2023-08-06 ENCOUNTER — APPOINTMENT (OUTPATIENT)
Dept: CT IMAGING | Age: 38
End: 2023-08-06
Payer: COMMERCIAL

## 2023-08-06 ENCOUNTER — HOSPITAL ENCOUNTER (EMERGENCY)
Age: 38
Discharge: HOME | End: 2023-08-06
Attending: EMERGENCY MEDICINE
Payer: COMMERCIAL

## 2023-08-06 VITALS
TEMPERATURE: 98.4 F | DIASTOLIC BLOOD PRESSURE: 85 MMHG | HEIGHT: 64 IN | OXYGEN SATURATION: 100 % | WEIGHT: 293 LBS | SYSTOLIC BLOOD PRESSURE: 140 MMHG | RESPIRATION RATE: 20 BRPM | BODY MASS INDEX: 50.02 KG/M2 | HEART RATE: 62 BPM

## 2023-08-06 DIAGNOSIS — M54.9 MID BACK PAIN ON LEFT SIDE: Primary | ICD-10-CM

## 2023-08-06 LAB
ANION GAP SERPL CALCULATED.3IONS-SCNC: 9 MMOL/L (ref 3–16)
BACTERIA URNS QL MICRO: ABNORMAL /HPF
BASOPHILS # BLD: 0.1 K/UL (ref 0–0.2)
BASOPHILS NFR BLD: 0.7 %
BILIRUB UR QL STRIP.AUTO: NEGATIVE
BUN SERPL-MCNC: 12 MG/DL (ref 7–20)
CALCIUM SERPL-MCNC: 8.8 MG/DL (ref 8.3–10.6)
CHLORIDE SERPL-SCNC: 103 MMOL/L (ref 99–110)
CLARITY UR: CLEAR
CO2 SERPL-SCNC: 28 MMOL/L (ref 21–32)
COLOR UR: YELLOW
CREAT SERPL-MCNC: 0.7 MG/DL (ref 0.6–1.1)
DEPRECATED RDW RBC AUTO: 16.5 % (ref 12.4–15.4)
EOSINOPHIL # BLD: 0 K/UL (ref 0–0.6)
EOSINOPHIL NFR BLD: 0.3 %
EPI CELLS #/AREA URNS HPF: ABNORMAL /HPF (ref 0–5)
GFR SERPLBLD CREATININE-BSD FMLA CKD-EPI: >60 ML/MIN/{1.73_M2}
GLUCOSE SERPL-MCNC: 90 MG/DL (ref 70–99)
GLUCOSE UR STRIP.AUTO-MCNC: NEGATIVE MG/DL
HCG UR QL: NEGATIVE
HCT VFR BLD AUTO: 37.3 % (ref 36–48)
HGB BLD-MCNC: 12.3 G/DL (ref 12–16)
HGB UR QL STRIP.AUTO: NEGATIVE
KETONES UR STRIP.AUTO-MCNC: NEGATIVE MG/DL
LEUKOCYTE ESTERASE UR QL STRIP.AUTO: ABNORMAL
LYMPHOCYTES # BLD: 3 K/UL (ref 1–5.1)
LYMPHOCYTES NFR BLD: 30.1 %
MACROCYTES BLD QL SMEAR: ABNORMAL
MCH RBC QN AUTO: 21.9 PG (ref 26–34)
MCHC RBC AUTO-ENTMCNC: 33 G/DL (ref 31–36)
MCV RBC AUTO: 66.4 FL (ref 80–100)
MONOCYTES # BLD: 0.7 K/UL (ref 0–1.3)
MONOCYTES NFR BLD: 7.1 %
NEUTROPHILS # BLD: 6.2 K/UL (ref 1.7–7.7)
NEUTROPHILS NFR BLD: 61.8 %
NITRITE UR QL STRIP.AUTO: NEGATIVE
PATH INTERP BLD-IMP: YES
PH UR STRIP.AUTO: 6 [PH] (ref 5–8)
PLATELET # BLD AUTO: 270 K/UL (ref 135–450)
PMV BLD AUTO: 7.9 FL (ref 5–10.5)
POTASSIUM SERPL-SCNC: 3.9 MMOL/L (ref 3.5–5.1)
PROT UR STRIP.AUTO-MCNC: NEGATIVE MG/DL
RBC # BLD AUTO: 5.62 M/UL (ref 4–5.2)
RBC #/AREA URNS HPF: ABNORMAL /HPF (ref 0–4)
SLIDE REVIEW: ABNORMAL
SODIUM SERPL-SCNC: 140 MMOL/L (ref 136–145)
SP GR UR STRIP.AUTO: 1.01 (ref 1–1.03)
TROPONIN, HIGH SENSITIVITY: 10 NG/L (ref 0–14)
UA COMPLETE W REFLEX CULTURE PNL UR: ABNORMAL
UA DIPSTICK W REFLEX MICRO PNL UR: YES
URN SPEC COLLECT METH UR: ABNORMAL
UROBILINOGEN UR STRIP-ACNC: 0.2 E.U./DL
WBC # BLD AUTO: 10 K/UL (ref 4–11)
WBC #/AREA URNS HPF: ABNORMAL /HPF (ref 0–5)

## 2023-08-06 PROCEDURE — 81001 URINALYSIS AUTO W/SCOPE: CPT

## 2023-08-06 PROCEDURE — 36415 COLL VENOUS BLD VENIPUNCTURE: CPT

## 2023-08-06 PROCEDURE — 85025 COMPLETE CBC W/AUTO DIFF WBC: CPT

## 2023-08-06 PROCEDURE — 80048 BASIC METABOLIC PNL TOTAL CA: CPT

## 2023-08-06 PROCEDURE — 6360000004 HC RX CONTRAST MEDICATION: Performed by: PHYSICIAN ASSISTANT

## 2023-08-06 PROCEDURE — 84484 ASSAY OF TROPONIN QUANT: CPT

## 2023-08-06 PROCEDURE — 93005 ELECTROCARDIOGRAM TRACING: CPT | Performed by: PHYSICIAN ASSISTANT

## 2023-08-06 PROCEDURE — 6370000000 HC RX 637 (ALT 250 FOR IP): Performed by: PHYSICIAN ASSISTANT

## 2023-08-06 PROCEDURE — 71260 CT THORAX DX C+: CPT

## 2023-08-06 PROCEDURE — 99285 EMERGENCY DEPT VISIT HI MDM: CPT

## 2023-08-06 PROCEDURE — 84703 CHORIONIC GONADOTROPIN ASSAY: CPT

## 2023-08-06 RX ORDER — HYDROCODONE BITARTRATE AND ACETAMINOPHEN 5; 325 MG/1; MG/1
1 TABLET ORAL EVERY 6 HOURS PRN
Qty: 12 TABLET | Refills: 0 | Status: SHIPPED | OUTPATIENT
Start: 2023-08-06 | End: 2023-08-09

## 2023-08-06 RX ORDER — HYDROCODONE BITARTRATE AND ACETAMINOPHEN 5; 325 MG/1; MG/1
1 TABLET ORAL EVERY 6 HOURS PRN
Qty: 12 TABLET | Refills: 0 | Status: SHIPPED | OUTPATIENT
Start: 2023-08-06 | End: 2023-08-06 | Stop reason: SDUPTHER

## 2023-08-06 RX ORDER — NAPROXEN 500 MG/1
500 TABLET ORAL 2 TIMES DAILY WITH MEALS
Qty: 30 TABLET | Refills: 0 | Status: SHIPPED | OUTPATIENT
Start: 2023-08-06

## 2023-08-06 RX ORDER — HYDROCODONE BITARTRATE AND ACETAMINOPHEN 5; 325 MG/1; MG/1
1 TABLET ORAL ONCE
Status: COMPLETED | OUTPATIENT
Start: 2023-08-06 | End: 2023-08-06

## 2023-08-06 RX ORDER — NAPROXEN 500 MG/1
500 TABLET ORAL 2 TIMES DAILY WITH MEALS
Qty: 30 TABLET | Refills: 0 | Status: SHIPPED | OUTPATIENT
Start: 2023-08-06 | End: 2023-08-06 | Stop reason: SDUPTHER

## 2023-08-06 RX ADMIN — HYDROCODONE BITARTRATE AND ACETAMINOPHEN 1 TABLET: 5; 325 TABLET ORAL at 19:04

## 2023-08-06 RX ADMIN — IOMEPROL INJECTION 75 ML: 714 INJECTION, SOLUTION INTRAVASCULAR at 19:58

## 2023-08-06 ASSESSMENT — PAIN SCALES - GENERAL
PAINLEVEL_OUTOF10: 5
PAINLEVEL_OUTOF10: 7
PAINLEVEL_OUTOF10: 5
PAINLEVEL_OUTOF10: 7

## 2023-08-06 ASSESSMENT — PAIN DESCRIPTION - PAIN TYPE: TYPE: ACUTE PAIN

## 2023-08-06 ASSESSMENT — PAIN DESCRIPTION - LOCATION
LOCATION: BACK

## 2023-08-06 ASSESSMENT — PAIN DESCRIPTION - DESCRIPTORS: DESCRIPTORS: SPASM

## 2023-08-06 ASSESSMENT — PAIN - FUNCTIONAL ASSESSMENT
PAIN_FUNCTIONAL_ASSESSMENT: 0-10
PAIN_FUNCTIONAL_ASSESSMENT: 0-10

## 2023-08-06 ASSESSMENT — PAIN DESCRIPTION - ORIENTATION: ORIENTATION: MID

## 2023-08-06 NOTE — ED TRIAGE NOTES
C/o back pain x 10 days. Pt states she was seen here 6 days ago for same and now pain is moving up to mid back. 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

## 2023-08-07 LAB
EKG ATRIAL RATE: 56 BPM
EKG DIAGNOSIS: NORMAL
EKG P AXIS: 41 DEGREES
EKG P-R INTERVAL: 136 MS
EKG Q-T INTERVAL: 380 MS
EKG QRS DURATION: 94 MS
EKG QTC CALCULATION (BAZETT): 366 MS
EKG R AXIS: 54 DEGREES
EKG T AXIS: 3 DEGREES
EKG VENTRICULAR RATE: 56 BPM

## 2023-08-07 PROCEDURE — 93010 ELECTROCARDIOGRAM REPORT: CPT | Performed by: INTERNAL MEDICINE

## 2023-08-07 NOTE — DISCHARGE INSTRUCTIONS
If you begin with any chest pain, shortness of breath, trouble breathing, new or worsening pain, new leg numbness/tingling/weakness, incontinence of urine or bowel, numbness or tingling to inner thighs or any other concerning symptoms return to ED for reevaluation. Follow-up closely primary care doctor.

## 2023-08-08 LAB — PATH INTERP BLD-IMP: NORMAL

## 2023-08-09 NOTE — RESULT ENCOUNTER NOTE
Patient's positive result has been appropriately evaluated by the provider pool. Patient was contacted and notified of the results.   Medication was phoned to the patient's pharmacy per protocol:    Pharmacy:   [facility] [id number] [place] [id number]  Phone: [phone] Fax: [phone]    [address] [address]  Phone: [phone] Fax: [phone]    Phone number:   Pharmacist receiving the prescription:

## 2024-02-05 ENCOUNTER — APPOINTMENT (OUTPATIENT)
Dept: GENERAL RADIOLOGY | Age: 39
End: 2024-02-05
Payer: COMMERCIAL

## 2024-02-05 ENCOUNTER — HOSPITAL ENCOUNTER (EMERGENCY)
Age: 39
Discharge: HOME | End: 2024-02-05
Attending: EMERGENCY MEDICINE
Payer: COMMERCIAL

## 2024-02-05 VITALS
TEMPERATURE: 98.6 F | SYSTOLIC BLOOD PRESSURE: 157 MMHG | RESPIRATION RATE: 18 BRPM | OXYGEN SATURATION: 97 % | BODY MASS INDEX: 61.11 KG/M2 | DIASTOLIC BLOOD PRESSURE: 80 MMHG | HEART RATE: 78 BPM | WEIGHT: 293 LBS

## 2024-02-05 DIAGNOSIS — M25.572 ACUTE LEFT ANKLE PAIN: Primary | ICD-10-CM

## 2024-02-05 PROCEDURE — 73630 X-RAY EXAM OF FOOT: CPT

## 2024-02-05 PROCEDURE — 6370000000 HC RX 637 (ALT 250 FOR IP): Performed by: EMERGENCY MEDICINE

## 2024-02-05 PROCEDURE — 73610 X-RAY EXAM OF ANKLE: CPT

## 2024-02-05 PROCEDURE — 99283 EMERGENCY DEPT VISIT LOW MDM: CPT

## 2024-02-05 RX ORDER — NAPROXEN 500 MG/1
500 TABLET ORAL 2 TIMES DAILY WITH MEALS
Qty: 60 TABLET | Refills: 5 | Status: SHIPPED | OUTPATIENT
Start: 2024-02-05

## 2024-02-05 RX ORDER — CYCLOBENZAPRINE HCL 10 MG
10 TABLET ORAL ONCE
Status: COMPLETED | OUTPATIENT
Start: 2024-02-05 | End: 2024-02-05

## 2024-02-05 RX ORDER — TIZANIDINE 4 MG/1
4 TABLET ORAL EVERY 6 HOURS PRN
Status: DISCONTINUED | OUTPATIENT
Start: 2024-02-05 | End: 2024-02-05

## 2024-02-05 RX ORDER — LIDOCAINE 4 G/G
1 PATCH TOPICAL ONCE
Status: DISCONTINUED | OUTPATIENT
Start: 2024-02-05 | End: 2024-02-05 | Stop reason: HOSPADM

## 2024-02-05 RX ORDER — TIZANIDINE 4 MG/1
4 TABLET ORAL EVERY 6 HOURS PRN
Qty: 20 TABLET | Refills: 0 | Status: SHIPPED | OUTPATIENT
Start: 2024-02-05

## 2024-02-05 RX ORDER — LIDOCAINE 4 G/G
1 PATCH TOPICAL DAILY
Qty: 30 PATCH | Refills: 0 | Status: SHIPPED | OUTPATIENT
Start: 2024-02-05 | End: 2024-03-06

## 2024-02-05 RX ORDER — NAPROXEN 250 MG/1
500 TABLET ORAL ONCE
Status: COMPLETED | OUTPATIENT
Start: 2024-02-05 | End: 2024-02-05

## 2024-02-05 RX ADMIN — CYCLOBENZAPRINE 10 MG: 10 TABLET, FILM COATED ORAL at 20:13

## 2024-02-05 RX ADMIN — NAPROXEN SODIUM 500 MG: 250 TABLET ORAL at 19:59

## 2024-02-05 ASSESSMENT — PAIN DESCRIPTION - FREQUENCY: FREQUENCY: INTERMITTENT

## 2024-02-05 ASSESSMENT — PAIN SCALES - GENERAL
PAINLEVEL_OUTOF10: 0
PAINLEVEL_OUTOF10: 9

## 2024-02-05 ASSESSMENT — PAIN DESCRIPTION - PAIN TYPE: TYPE: ACUTE PAIN

## 2024-02-05 ASSESSMENT — PAIN - FUNCTIONAL ASSESSMENT
PAIN_FUNCTIONAL_ASSESSMENT: 0-10
PAIN_FUNCTIONAL_ASSESSMENT: PREVENTS OR INTERFERES SOME ACTIVE ACTIVITIES AND ADLS

## 2024-02-05 ASSESSMENT — PAIN DESCRIPTION - LOCATION: LOCATION: ANKLE;FOOT

## 2024-02-05 ASSESSMENT — PAIN DESCRIPTION - ORIENTATION: ORIENTATION: LEFT

## 2024-02-06 NOTE — ED TRIAGE NOTES
Pt to ER with concern for left ankle and foot pain that started last Thursday.    Pt denies specific injury but does report she was drinking and then woke up w the pain    Gcs 15  VSS

## 2024-02-06 NOTE — ED PROVIDER NOTES
[facility]  EMERGENCY DEPARTMENT ENCOUNTER        Pt Name: Anat Romano  MRN: [id number]  Birthdate 1985  Date of evaluation: 2024  Provider: Jesus Nicolas MD  PCP: Jonathan  Note Started: 5:25 AM EST 24    CHIEF COMPLAINT       Chief Complaint   Patient presents with    Ankle Pain    Foot Pain       HISTORY OF PRESENT ILLNESS: 1 or more Elements   History From: Patient        Anat Romano is a 38 y.o. female who presents to the ED for evaluation of left ankle pain.  Patient reports that she thinks she injured her ankle intoxicated a few days previously.  She is unsure of the mechanism of her injury.  She states she is able to bear weight but it does cause discomfort.  She has not taken medications for symptoms.  Has a history of previous injury.  Denies knee pain or hip pain.     Nursing Notes were all reviewed and agreed with or any disagreements were addressed in the HPI.    REVIEW OF SYSTEMS :      Review of Systems    Positives and Pertinent negatives as per HPI.     SURGICAL HISTORY     Past Surgical History:   Procedure Laterality Date     SECTION      x 3    ORBITAL FRACTURE SURGERY  2020    TUBAL LIGATION         CURRENTMEDICATIONS       Discharge Medication List as of 2024  8:10 PM        CONTINUE these medications which have NOT CHANGED    Details   !! naproxen (NAPROSYN) 500 MG tablet Take 1 tablet by mouth 2 times daily (with meals), Disp-30 tablet, R-0Normal      acetaminophen (TYLENOL) 500 MG tablet Take 2 tablets by mouth 3 times daily as needed for Pain, Disp-180 tablet, R-0Normal      rizatriptan (MAXALT) 5 MG tablet Take 1 tablet by mouth daily as needed (Headaches) May repeat in 2 hours if needed, Disp-5 tablet, R-0Normal       !! - Potential duplicate medications found. Please discuss with provider.          ALLERGIES     Patient has no known allergies.    FAMILYHISTORY       Family History   Problem Relation Age of Onset    No Known

## 2024-02-06 NOTE — ED NOTES
Pt cleared to discharge, Departure Condition was Good , Ambulated. Discharge instructions reviewed; Follow-up care advised; Pain management discussed; Medications discussed; Patient verbalized understanding; Family verbalized understanding